# Patient Record
Sex: MALE | Race: OTHER | HISPANIC OR LATINO | Employment: UNEMPLOYED | URBAN - METROPOLITAN AREA
[De-identification: names, ages, dates, MRNs, and addresses within clinical notes are randomized per-mention and may not be internally consistent; named-entity substitution may affect disease eponyms.]

---

## 2022-05-05 ENCOUNTER — DOCUMENTATION (OUTPATIENT)
Dept: PSYCHIATRY | Facility: CLINIC | Age: 42
End: 2022-05-05

## 2022-05-05 ENCOUNTER — TELEPHONE (OUTPATIENT)
Dept: PSYCHIATRY | Facility: CLINIC | Age: 42
End: 2022-05-05

## 2022-05-05 NOTE — TELEPHONE ENCOUNTER
SHARE Program Intake Questions       Referred by: Self Referral    Please advise interviewee that they need to answer all questions truthfully to allow for best care and any misrepresentations of information may affect their ability to be seen at this clinic     Was this discussed? yes      SHARE Program Intake History -        Presenting Problem (in patient's words): Past use of Heroin, currently on Suboxone (3x per day)  Came from [place], [place]  Needs to establish new suboxone prescriber  hasnt used in 3 yrs  Needs hellp transferring insurance to [facility]        Are there any developmental disabilities? No    Does the patient have a language barrier or hearing impairment? yes   first language Tristanian, but can speak English    Substance Use-       1  Are you being referred for treatment of any of the following: alcohol, benzodiazepines, baclofen, GHB, phenibut, or Soma? No  Please refer to detox       2  Are you being referred for the treatment of opioid use? Yes  Are you currently prescribed buprenorphine (Suboxone or Subutex)? Yes  Who prescribes? Dr Ilsa Hu      3  For what substance use are you being referred to the [address] [place] [address]? Heroin - Last use 3 years ago       Psychiatric Care-        Do you have a psychiatric diagnosis? Yes  What is the diagnosis? Bipolar, Depression    2  Are you taking any psychiatric medications? No     3   Have you been treated at [facility] [place] 2[facility] by a therapist or a doctor in the past? If yes, who? No       4  Are you currently having thoughts of harming yourself or others? No     5   Have you been hospitalized for mental health? No     6  Do you have a community treatment team or ? no       Legal History-         Do you have any history of arrests, custodial/penitentiary time, or DUIs? Yes  What types of charges? Possesion    When were they last incarcerated?     Are they currently on parole or probation? No        Prenatal/         Are you pregnant?  No        Have you given birth in the last 28 days?  No        Intake Team, please check with provider before scheduling if flags come up such as:     - complex case     - legal history (other than DUI)     - communication barrier concerns are present     - already being prescribed buprenorphine or methadone     - if, in your judgment, this needs further review        ACCEPTED as a patient Yes   Appointment Date: 05/09/2022 @ 12:15pm       Referred Elsewhere? no          Name of Insurance Co:     Insurance ID#     [employer] #     If ins is primary or secondary

## 2022-05-09 ENCOUNTER — OFFICE VISIT (OUTPATIENT)
Dept: OTHER | Age: 42
End: 2022-05-09

## 2022-05-09 DIAGNOSIS — B18.2 CHRONIC HEPATITIS C WITHOUT HEPATIC COMA (HCC): ICD-10-CM

## 2022-05-09 DIAGNOSIS — F11.21 OPIOID USE DISORDER, SEVERE, IN SUSTAINED REMISSION, ON MAINTENANCE THERAPY (HCC): Primary | ICD-10-CM

## 2022-05-09 PROBLEM — B19.20 HEPATITIS C: Status: ACTIVE | Noted: 2022-05-09

## 2022-05-09 PROCEDURE — 99204 OFFICE O/P NEW MOD 45 MIN: CPT | Performed by: EMERGENCY MEDICINE

## 2022-05-09 RX ORDER — BUPRENORPHINE HYDROCHLORIDE AND NALOXONE HYDROCHLORIDE DIHYDRATE 8; 2 MG/1; MG/1
1 TABLET SUBLINGUAL 3 TIMES DAILY
COMMUNITY
End: 2022-05-09

## 2022-05-09 RX ORDER — GABAPENTIN 800 MG/1
800 TABLET ORAL 3 TIMES DAILY
COMMUNITY

## 2022-05-09 RX ORDER — ALBUTEROL SULFATE 90 UG/1
2 AEROSOL, METERED RESPIRATORY (INHALATION) EVERY 6 HOURS PRN
COMMUNITY

## 2022-05-09 RX ORDER — NALOXONE HYDROCHLORIDE 4 MG/.1ML
SPRAY NASAL
Qty: 1 EACH | Refills: 1 | Status: SHIPPED | OUTPATIENT
Start: 2022-05-09

## 2022-05-09 RX ORDER — BUPRENORPHINE AND NALOXONE 8; 2 MG/1; MG/1
8 FILM, SOLUBLE BUCCAL; SUBLINGUAL 3 TIMES DAILY
Qty: 15 FILM | Refills: 0 | Status: SHIPPED | OUTPATIENT
Start: 2022-05-09

## 2022-05-09 NOTE — PATIENT INSTRUCTIONS
Trastorno por uso de opiáceos   LO QUE NECESITA SABER:   El trastorno por uso de opiáceos es roney condición médica que se desarrolla a partir del uso prolongado o uso indebido de un opiáceo  No es capaz de dejar de jaciel los opiáceos aunque le causen problemas físicos o [place]  El trastorno por uso de opiáceos puede consistir en el uso de un opiáceo, venita la heroína, o en el uso indebido de un opiáceo con receta, venita el fentanilo  Mandi trastorno también se denomina abuso de opiáceos  INSTRUCCIONES SOBRE EL GAIL HOSPITALARIA:   Llame al [employer] de emergencias (911 en [address] ) o pídale a alguien que llame si:  · Usted tiene dolor torácico y dificultad para respirar  · Usted sufre roney convulsión  · No es posible despertarlo  Busque atención médica de inmediato si:  · Usted tiene dificultad para permanecer despierto y laura respiración es lenta o superficial     · Laura ritmo cardíaco está acelerado, lento o irregular  · Usted tiene la piel pálida o fría  · Se siente desvanecer o desmayar  · Usted habla enredado y no pronuncia nancy las palabras o está confundido  Llame a laura médico si:  · Tiene náuseas y vómitos, o no puede dejar de vomitar  · Tienes problemas de equilibrio  · Usted tiene preguntas o inquietudes acerca de laura condición o cuidado  La terapia puede realizarse en un hospital, centro ambulatorio o [facility]  El médico puede ayudarlo a jaciel decisiones [place]  La terapia puede incluir el trabajo con un psiquiatra, psicólogo o terapeuta  La terapia puede realizarse en sesiones grupales o individuales  Algunas terapias pueden incluir miembros de la rashmi  Laura médico o terapeuta puede ayudarle a encontrar un sarah de apoyo en laura área  Un sarah de apoyo es [place] de obtener ayuda de otras personas con trastorno por uso de opiáceos    Lo que usted necesita saber acerca de la seguridad de los opiáceos:  · No suspenda el uso de los opiáceos súbitamente  Roney interrupción repentina puede causar efectos secundarios peligrosos  Hable al respecto con laura médico para disminuir la cantidad poco a poco     · No tome los opiáceos recetados que pertenezcan a alguien más  Es posible que la cantidad que roney persona chino no sea la adecuada para usted  · No mezcle los opiáceos con otros medicamentos, drogas o alcohol  La combinación puede causar roney sobredosis o hacer que usted deje de respirar  El alcohol, las pastillas para dormir y los Saul Radnor antihistamínicos pueden hacer que usted tenga sueño  [place] combinación con opiáceos puede llevar a un coma  · Aprenda acerca de las señales de roney sobredosis  Por ejemplo, respiración lenta, piel pálida o fría y pupilas pequeñas  Informe a otros acerca de estas señales para que sepan qué hacer si es necesario  Consulte con laura médico acerca de la naloxona  Posiblemente pueda conservar la naloxona en laura hogar en pranay de roney sobredosis  Laura rashmi y [employer] pueden aprender cómo dársela si es necesario  · [place] un opiáceo recetado exactamente venita se indica  No tome más de la cantidad recomendada  No lo tome con más frecuencia de la recomendada o por un motivo diferente  Retire siempre el parche idania antes de ponerse el nuevo  Asegúrese de que el parche no esté expuesto a la cortney solar  La cortney solar acelera la liberación de opiáceos del parche  · Mantenga los opiáceos fuera del alcance de los niños  Guarde los opiáceos en un gabinete con candado o en roney ubicación a la que los niños no puedan llegar  · Siga las instrucciones sobre qué hacer con los opiáceos recetados sobrantes  Laura médico le dará instrucciones sobre cómo desecharlos con seguridad  [place] ayuda a asegurarse de que nadie más tenga acceso a ellos  Acuda a tori consultas de control con laura médico o terapeuta según le indicaron: Anote tori preguntas para que se acuerde de hacerlas osman tori visitas    Para [de-identified] y [place] información:  · Substance Abuse and [facility] [address] [place] [phone]  Web Address: https://Amimon/    · [facility] on Drug Abuse  [address] [place] [phone]  Phone: 6- 564 - 404-5135  Web Address: www pham nih gov    © [facility] 2022 Information is for End User's use only and may not be sold, redistributed or otherwise used for commercial purposes  All illustrations and images included in CareNotes® are the copyrighted property of [employer]  or [address] [place] [address] es sólo para uso en educación  Laura intención no es darle un consejo médico sobre enfermedades o tratamientos  Colsulte con laura Stef Kay farmacéutico antes de seguir cualquier régimen médico para saber si es seguro y efectivo para usted

## 2022-05-09 NOTE — ASSESSMENT & PLAN NOTE
· Pt states he is on Harvoni treatment and has about 4 weeks left  Will refer to GI for treatment completion and obtain viral load, etc given that we have no info and he is just establishing care here

## 2022-05-09 NOTE — PROGRESS NOTES
SHARE Program     History and Physical  Mayank Garcia 38 y o  male MRN: [id number]   @ Encounter: [id number]       1  Opioid use disorder, severe, in sustained remission, on maintenance therapy (HCC)  -     buprenorphine-naloxone (Suboxone) 8-2 mg; Place 1 Film (8 mg total) under the tongue 3 (three) times a day  -     naloxone (NARCAN) 4 mg/0 1 mL nasal spray; Administer 1 spray into a nostril  If no response after 2-3 minutes, give another dose in the other nostril using a new spray  2  Chronic hepatitis C without hepatic coma (HCC)  Assessment & Plan:  · Pt states he is on Harvoni treatment and has about 4 weeks left  Will refer to GI for treatment completion and obtain viral load, etc given that we have no info and he is just establishing care here  Orders:  -     Ambulatory referral to Gastroenterology; Future  -     Hepatitis C RNA, quantitative, PCR; Future  -     Comprehensive metabolic panel; Future  -     CBC and differential; Future      In addition to the above recommendations, the patient will also be referred to the [facility] Certified Addiction Counselors for additional evaluation and psychotherapy  We will also engage our Certified  for patient support  HPI: Mayank Garcia is a 38y o  year old male who presents with OUD  The patient started using heroin and cocaine at age 17  He was using via insufflation with IV use once per week  That persisted until the year 2000 when he entered a [facility] based treatment program  He had no use until 2005 when he suffered recurrence of use with percocet  By 2006, he was using heroin again  In 2007, he re-entered a [facility]-based program  In 2008, he had knee surgery and was prescribed percocet, which resulted in recurrence of use of heroin eventually  He eventually was started on buprenorphine 5 years ago  3 years ago, he was [facility] to [facility] for ongoing problems with continued intermittent use   Since 2019, he has been stable on buprenorphine without use  He is now in the process of moving from [place] to here  His buprenorphine was prescribed by a physician in [place]  He will have access to 30 days supply by Friday when his X-wife brings him his medications from [place]  He states he needs a 5 day supply until then  Opioid History   Age of first use: 15   Opioids currently used: fentanyl   Route of use: insufflation and intravenous  Date/Time of Last Opioid Use: 3 years ago   Current Signs/Symptoms of Opioid Withdrawal: none    OUD MAT History   Currently on methadone maintenance? no   Currently on naltrexone? no   Currently taking buprenorphine? yes   History of prior treatment with buprenorphine? yes   History of using buprenorphine without having a prescription? yes   History of IVDU? yes   Co-existing substance use? Yes, has used cocaine previously, but none in the past 3 years    Review of PDMP:     PDMP Review       Value Time User    PDMP Reviewed  Yes 5/9/2022  8:43 AM Azar Walker,              Social History     Tobacco Use   Smoking Status Current Every Day Smoker    Packs/day: 0 50   Smokeless Tobacco Not on file        Review of Systems   Constitutional: Negative for chills and fever  HENT: Negative for ear pain and sore throat  Eyes: Negative for pain and visual disturbance  Respiratory: Negative for cough and shortness of breath  Cardiovascular: Negative for chest pain and palpitations  Gastrointestinal: Negative for abdominal pain and vomiting  Genitourinary: Negative for dysuria and hematuria  Musculoskeletal: Negative for arthralgias and back pain  Skin: Negative for color change and rash  Neurological: Negative for seizures and syncope  All other systems reviewed and are negative     ROS     Historical Information      Past Medical History:   Diagnosis Date    Asthma     Hepatitis C     Migraines         Past Surgical History:   Procedure Laterality Date    KNEE SURGERY History reviewed  No pertinent family history  Social History      Marital Status: Single        Patient Pre-hospital Living Situation: stable housing    Communicable diseases:                   Not on File     Prior to Admission medications    Medication Sig Start Date End Date Taking? Authorizing Provider   albuterol (PROVENTIL HFA,VENTOLIN HFA) 90 mcg/act inhaler Inhale 2 puffs every 6 (six) hours as needed for wheezing   Yes Historical Provider, MD   gabapentin (NEURONTIN) 800 mg tablet Take 800 mg by mouth 3 (three) times a day   Yes Historical Provider, MD   buprenorphine-naloxone (SUBOXONE) 8-2 mg per SL tablet Place 1 tablet under the tongue 3 (three) times a day  5/9/22 Yes Historical Provider, MD   buprenorphine-naloxone (Suboxone) 8-2 mg Place 1 Film (8 mg total) under the tongue 3 (three) times a day 5/9/22   Carmen Lauren DO   naloxone [facility]) 4 mg/0 1 mL nasal spray Administer 1 spray into a nostril  If no response after 2-3 minutes, give another dose in the other nostril using a new spray  5/9/22   Carmen Lauren DO       buprenorphine-naloxone     Objective      Vitals    There were no vitals filed for this visit  Physical Exam  Constitutional:       Appearance: Normal appearance  HENT:      Nose: No congestion  Mouth/Throat:      Mouth: Mucous membranes are moist    Eyes:      Extraocular Movements: Extraocular movements intact  Conjunctiva/sclera: Conjunctivae normal    Cardiovascular:      Rate and Rhythm: Normal rate  Pulmonary:      Effort: Pulmonary effort is normal    Abdominal:      Palpations: Abdomen is soft  Musculoskeletal:         General: Normal range of motion  Skin:     General: Skin is warm  Neurological:      Mental Status: He is oriented to person, place, and time     Psychiatric:         Mood and Affect: Mood normal             COWS Score:          Data:     Lab Results:                Hepatitis panel:        HIV results:       TB: Imaging Studies: I have personally reviewed pertinent reports  EKG, Pathology, and Other Studies: I have personally reviewed pertinent reports  Counseling / Coordination of Care     Total time spent today 45 minutes  Greater than 50% of total time was spent with the patient and / or family counseling and / or coordination of care  ** Please Note: This note may have been constructed using a voice recognition system   **     Jane Way,

## 2022-05-24 ENCOUNTER — DOCUMENTATION (OUTPATIENT)
Dept: PSYCHIATRY | Facility: CLINIC | Age: 42
End: 2022-05-24

## 2022-05-24 NOTE — PROGRESS NOTES
Note Type: Case Management Note                  Date of Service: 05/24/2022  Service:  [address] [facility] MAT Office    Note: Case Management Note  Rabia Melgoza 38 y o  male [id number]  Attending  Substance Use History     Social History     Substance and Sexual Activity   Alcohol Use Not Currently        Social History     Substance and Sexual Activity   Drug Use Not Currently       Encounter Type:   Patient Face-to-Face    Start Time 1100  End Time 1130    Recovery needs addressed at this meeting:  Basic  Needs    Note  D: Patient presented for in-person, scheduled visit  Patient advised that current living situation is no longer working out and would like assistance with housing in order to maintain sobriety  Patient and CM worked together in completing referral conference of [place]  A: Patient presented in a somewhat agitate mood  Patient was able to discuss the current barriers, such as trust issues that have cause current livivng situation to longer longer work in patient's favor  P: Patient will continue to follow-up with CM on a weekly basis   Patient hopes to find a one room apartment or shared space,    Referrals made  Conference of [facility]    Next appointment date and time  05/31/2022 @ 1030am